# Patient Record
Sex: MALE | Race: WHITE | NOT HISPANIC OR LATINO | Employment: OTHER | URBAN - METROPOLITAN AREA
[De-identification: names, ages, dates, MRNs, and addresses within clinical notes are randomized per-mention and may not be internally consistent; named-entity substitution may affect disease eponyms.]

---

## 2022-05-18 ASSESSMENT — ENCOUNTER SYMPTOMS
SHORTNESS OF BREATH: 0
HEMATURIA: 0
NAUSEA: 0
WEAKNESS: 0
FREQUENCY: 0
COUGH: 0
PALPITATIONS: 0
ABDOMINAL PAIN: 0
CONSTIPATION: 0
HEMATOCHEZIA: 0
DIARRHEA: 0
DYSURIA: 0
HEARTBURN: 0
ARTHRALGIAS: 0
MYALGIAS: 0
FEVER: 0
DIZZINESS: 0
PARESTHESIAS: 0
NERVOUS/ANXIOUS: 0
EYE PAIN: 0
HEADACHES: 0
CHILLS: 0
SORE THROAT: 0
JOINT SWELLING: 0

## 2022-05-20 ENCOUNTER — OFFICE VISIT (OUTPATIENT)
Dept: INTERNAL MEDICINE | Facility: CLINIC | Age: 37
End: 2022-05-20
Payer: COMMERCIAL

## 2022-05-20 VITALS
HEART RATE: 81 BPM | HEIGHT: 77 IN | WEIGHT: 217.5 LBS | DIASTOLIC BLOOD PRESSURE: 76 MMHG | TEMPERATURE: 97.3 F | OXYGEN SATURATION: 98 % | BODY MASS INDEX: 25.68 KG/M2 | SYSTOLIC BLOOD PRESSURE: 122 MMHG

## 2022-05-20 DIAGNOSIS — Z00.00 ROUTINE GENERAL MEDICAL EXAMINATION AT A HEALTH CARE FACILITY: Primary | ICD-10-CM

## 2022-05-20 DIAGNOSIS — Z80.42 FAMILY HISTORY OF PROSTATE CANCER: ICD-10-CM

## 2022-05-20 DIAGNOSIS — Z12.5 SCREENING FOR PROSTATE CANCER: ICD-10-CM

## 2022-05-20 DIAGNOSIS — Z13.220 ENCOUNTER FOR SCREENING FOR LIPID DISORDER: ICD-10-CM

## 2022-05-20 DIAGNOSIS — R03.0 ELEVATED BLOOD PRESSURE READING WITHOUT DIAGNOSIS OF HYPERTENSION: ICD-10-CM

## 2022-05-20 PROCEDURE — G0103 PSA SCREENING: HCPCS | Performed by: INTERNAL MEDICINE

## 2022-05-20 PROCEDURE — 80061 LIPID PANEL: CPT | Performed by: INTERNAL MEDICINE

## 2022-05-20 PROCEDURE — 36415 COLL VENOUS BLD VENIPUNCTURE: CPT | Performed by: INTERNAL MEDICINE

## 2022-05-20 PROCEDURE — 99385 PREV VISIT NEW AGE 18-39: CPT | Performed by: INTERNAL MEDICINE

## 2022-05-20 PROCEDURE — 80053 COMPREHEN METABOLIC PANEL: CPT | Performed by: INTERNAL MEDICINE

## 2022-05-20 RX ORDER — LORATADINE 10 MG/1
10 TABLET ORAL DAILY
COMMUNITY
End: 2023-03-21

## 2022-05-20 ASSESSMENT — ENCOUNTER SYMPTOMS
DYSURIA: 0
SHORTNESS OF BREATH: 0
DIZZINESS: 0
CONSTIPATION: 0
PALPITATIONS: 0
FEVER: 0
COUGH: 0
CHILLS: 0
ARTHRALGIAS: 0
FREQUENCY: 0
MYALGIAS: 0
NAUSEA: 0
HEMATURIA: 0
HEADACHES: 0
JOINT SWELLING: 0
HEMATOCHEZIA: 0
DIARRHEA: 0
HEARTBURN: 0
WEAKNESS: 0
NERVOUS/ANXIOUS: 0
PARESTHESIAS: 0
SORE THROAT: 0
ABDOMINAL PAIN: 0
EYE PAIN: 0

## 2022-05-20 NOTE — PROGRESS NOTES
SUBJECTIVE:   CC: Scot Navarro is an 36 year old male who presents for preventative health visit.     Patient has been advised of split billing requirements and indicates understanding: Yes     Healthy Habits:     Getting at least 3 servings of Calcium per day:  Yes    Bi-annual eye exam:  Yes    Dental care twice a year:  NO    Sleep apnea or symptoms of sleep apnea:  Excessive snoring    Diet:  Regular (no restrictions)    Frequency of exercise:  None    Taking medications regularly:  Not Applicable    Medication side effects:  Not applicable    PHQ-2 Total Score: 0    Additional concerns today:  No    Ayden presents today for a physical exam. This is the first time I have met Ayden. We also discussed his blood pressure.    Today's PHQ-2 Score:   PHQ-2 ( 1999 [employer]) 5/18/2022   Q1: Little interest or pleasure in doing things 0   Q2: Feeling down, depressed or hopeless 0   PHQ-2 Score 0   Q1: Little interest or pleasure in doing things Not at all   Q2: Feeling down, depressed or hopeless Not at all   PHQ-2 Score 0     Abuse: Current or Past (Physical, Sexual or Emotional)- No  Do you feel safe in your environment? Yes    Social History     Tobacco Use     Smoking status: Never Smoker     Smokeless tobacco: Never Used   Substance Use Topics     Alcohol use: Yes     Alcohol/week: 2.0 standard drinks     Types: 2 Standard drinks or equivalent per week     If you drink alcohol do you typically have >3 drinks per day or >7 drinks per week? No    Alcohol Use 5/20/2022   Prescreen: >3 drinks/day or >7 drinks/week? -   Prescreen: >3 drinks/day or >7 drinks/week? No     Reviewed orders with patient. Reviewed health maintenance and updated orders accordingly - Yes    Labs reviewed in EPIC    Reviewed and updated as needed this visit by clinical staff   Tobacco  Allergies  Meds  Problems  Med Hx  Surg Hx  Fam Hx  Soc   Hx        Reviewed and updated as needed this visit by Provider   Tobacco  Allergies  Meds   "Problems  Med Hx  Surg Hx  Fam Hx             Review of Systems   Constitutional: Negative for chills and fever.   HENT: Negative for congestion, ear pain, hearing loss and sore throat.    Eyes: Negative for pain and visual disturbance.   Respiratory: Negative for cough and shortness of breath.    Cardiovascular: Negative for chest pain, palpitations and peripheral edema.   Gastrointestinal: Negative for abdominal pain, constipation, diarrhea, heartburn, hematochezia and nausea.   Genitourinary: Negative for dysuria, frequency, genital sores, hematuria, impotence, penile discharge and urgency.   Musculoskeletal: Negative for arthralgias, joint swelling and myalgias.   Skin: Negative for rash.   Neurological: Negative for dizziness, weakness, headaches and paresthesias.   Psychiatric/Behavioral: Negative for mood changes. The patient is not nervous/anxious.      OBJECTIVE:   /76   Pulse 81   Temp 97.3  F (36.3  C) (Tympanic)   Ht 1.956 m (6' 5\")   Wt 98.7 kg (217 lb 8 oz)   SpO2 98%   BMI 25.79 kg/m      Physical Exam  GENERAL: In no distress.  EYES: Conjunctivae/corneas clear. EOMs grossly intact.  HENT: NC/AT, facies symmetric. Neck supple. No LAD or thyromegaly noted.  RESP: CTAB. No w/r/r.  CV: RRR, no m/r/g.  GI: NT, ND, without rebound or guarding, no CVA tenderness, no hepatosplenomegaly appreciated.  MSK: Moves all four extremities freely.  SKIN: No significant ulcers, lesions or rashes on the visualized portions of the skin  NEURO: Alert. Oriented.  PSYCH: Linear thought process. Speech normal rate and volume. No tangential thoughts, hallucinations, or delusions.    Diagnostic Test Results: Labs reviewed in [facility]    ASSESSMENT/PLAN:   Routine general medical examination at a health care facility  Reviewed PMH. Discussed healthcare maintenance issues, including cancer screenings (colonoscopies, PSA), relevant immunizations (reports he received a Td ~5 years ago in different state), and cardiac " "risk factor screenings such as for cholesterol, HTN, and DM.    Elevated blood pressure reading without diagnosis of hypertension  BP consistently >130/80 at home but normal today. Encouraged him to make a BP check appt with our pharmacy downstairs and to bring his cuff with for validation. If cuff accurate and/or BP found to be consistently high, would start anti-HTN medication at that time.  - Comprehensive metabolic panel; Future    Encounter for screening for lipid disorder  - Lipid panel reflex to direct LDL Fasting; Future    Family history of prostate cancer  Screening for prostate cancer  Reports his half-brother (related through their father)  of prostate cancer in his 50s. Spent some time discussion PSA screening guidelines. Will check baseline today.  - Prostate Specific Antigen Screen; Future    COUNSELING:   Reviewed preventive health counseling, as reflected in patient instructions  Special attention given to:        Colorectal cancer screening       Prostate cancer screening    Estimated body mass index is 25.79 kg/m  as calculated from the following:    Height as of this encounter: 1.956 m (6' 5\").    Weight as of this encounter: 98.7 kg (217 lb 8 oz).    He reports that he has never smoked. He has never used smokeless tobacco.    Trent Nelson MD  [facility]  "

## 2022-05-20 NOTE — PATIENT INSTRUCTIONS
- I will send you a message on [employer]Control when I am able to look at the results of your tests from today    How to check your blood pressure at home  You can buy a home monitor in a drugstore, supermarket pharmacy, or other large store. Not all automated blood pressure machines are created equal. You can find a list of validated blood pressure cuffs (meaning they have been confirmed to give accurate readings) by going to www.validatebp.org. That website does not sell blood pressure cuffs, but rather it lists the exact models that have been validated to be accurate. Wrist blood pressure cuffs do not provide reliable comparisons to upper arm (brachial) cuffs. Be sure the arm cuff is the right size for your arm. Ask someone to measure around your upper arm. If your upper arm is more than 13 inches around, buy a monitor with a large cuff. To get a correct measurement, the cuff needs to be the right size.    It is important to measure your blood pressure periodically at home in between office visits. The readings you obtain during these blood pressure checks are often more valuable than the readings we obtain in clinic. However, it is even more important to check your blood pressure CORRECTLY at home. Follow these tips.    Check your blood pressure in the early morning (before you eat, drink, or take any medicines) and at one other random time of day. The random time of day does not need to be consistent from day to day.  Put the cuff on your arm. Remove clothes that get in the way of the cuff. Don t roll up your sleeve in a way that s tight around your arm. The cord should go toward your hand. Line it up with the middle of your forearm. The Velcro should attach easily on the cuff. If it doesn t reach, you may need a bigger cuff.  Wait 30 minutes if you have just eaten a lot, had a drink with caffeine or alcohol, used tobacco products, or exercised. Use the restroom if you need to. (Needing to go can raise your BP.)  Rest  both feet flat on the floor with your back supported. Rest your arm at heart level on a table or the arm of a chair.  Sit quietly for 5 minutes or more before taking your blood pressure. Avoid talking while your blood pressure is being measured.  Start the monitor. Press the button or squeeze the ball to measure your blood pressure. Write down the time, the measurement, and your pulse.  Wait 2 minutes.  Repeat 2 more times.  Take the average of the readings. That's your blood pressure.    Lastly, bring your home monitor into the office for us to validate! Compare your blood pressure monitor to the standardized method we use. It's a good idea to do this once per machine or if your machine starts giving you odd readings (suddenly much higher or lower than you're used to).

## 2022-05-21 LAB
ALBUMIN SERPL-MCNC: 4.6 G/DL (ref 3.4–5)
ALP SERPL-CCNC: 64 U/L (ref 40–150)
ALT SERPL W P-5'-P-CCNC: 48 U/L (ref 0–70)
ANION GAP SERPL CALCULATED.3IONS-SCNC: 6 MMOL/L (ref 3–14)
AST SERPL W P-5'-P-CCNC: 25 U/L (ref 0–45)
BILIRUB SERPL-MCNC: 1.3 MG/DL (ref 0.2–1.3)
BUN SERPL-MCNC: 13 MG/DL (ref 7–30)
CALCIUM SERPL-MCNC: 9.1 MG/DL (ref 8.5–10.1)
CHLORIDE BLD-SCNC: 106 MMOL/L (ref 94–109)
CHOLEST SERPL-MCNC: 224 MG/DL
CO2 SERPL-SCNC: 28 MMOL/L (ref 20–32)
CREAT SERPL-MCNC: 0.85 MG/DL (ref 0.66–1.25)
FASTING STATUS PATIENT QL REPORTED: YES
GFR SERPL CREATININE-BSD FRML MDRD: >90 ML/MIN/1.73M2
GLUCOSE BLD-MCNC: 97 MG/DL (ref 70–99)
HDLC SERPL-MCNC: 50 MG/DL
LDLC SERPL CALC-MCNC: 152 MG/DL
NONHDLC SERPL-MCNC: 174 MG/DL
POTASSIUM BLD-SCNC: 3.9 MMOL/L (ref 3.4–5.3)
PROT SERPL-MCNC: 7.7 G/DL (ref 6.8–8.8)
PSA SERPL-MCNC: 0.82 UG/L (ref 0–4)
SODIUM SERPL-SCNC: 140 MMOL/L (ref 133–144)
TRIGL SERPL-MCNC: 108 MG/DL

## 2022-06-16 ENCOUNTER — TRANSFERRED RECORDS (OUTPATIENT)
Dept: INTERNAL MEDICINE | Facility: CLINIC | Age: 37
End: 2022-06-16

## 2022-09-29 ENCOUNTER — TRANSFERRED RECORDS (OUTPATIENT)
Dept: HEALTH INFORMATION MANAGEMENT | Facility: CLINIC | Age: 37
End: 2022-09-29

## 2022-10-03 ENCOUNTER — HEALTH MAINTENANCE LETTER (OUTPATIENT)
Age: 37
End: 2022-10-03

## 2023-02-05 ENCOUNTER — HOSPITAL ENCOUNTER (EMERGENCY)
Facility: CLINIC | Age: 38
Discharge: HOME | End: 2023-02-05
Attending: EMERGENCY MEDICINE | Admitting: EMERGENCY MEDICINE
Payer: COMMERCIAL

## 2023-02-05 ENCOUNTER — APPOINTMENT (OUTPATIENT)
Dept: CT IMAGING | Facility: CLINIC | Age: 38
End: 2023-02-05
Attending: EMERGENCY MEDICINE
Payer: COMMERCIAL

## 2023-02-05 VITALS
RESPIRATION RATE: 16 BRPM | DIASTOLIC BLOOD PRESSURE: 119 MMHG | HEART RATE: 91 BPM | WEIGHT: 210 LBS | HEIGHT: 77 IN | TEMPERATURE: 98.7 F | BODY MASS INDEX: 24.79 KG/M2 | OXYGEN SATURATION: 97 % | SYSTOLIC BLOOD PRESSURE: 182 MMHG

## 2023-02-05 DIAGNOSIS — N20.1 URETEROLITHIASIS: ICD-10-CM

## 2023-02-05 DIAGNOSIS — N20.0 NEPHROLITHIASIS: ICD-10-CM

## 2023-02-05 DIAGNOSIS — R31.29 MICROSCOPIC HEMATURIA: ICD-10-CM

## 2023-02-05 DIAGNOSIS — R79.89 BLOOD CREATININE INCREASED COMPARED WITH PRIOR MEASUREMENT: ICD-10-CM

## 2023-02-05 LAB
ALBUMIN UR-MCNC: 30 MG/DL
ANION GAP SERPL CALCULATED.3IONS-SCNC: 12 MMOL/L (ref 7–15)
APPEARANCE UR: CLEAR
BILIRUB UR QL STRIP: NEGATIVE
BUN SERPL-MCNC: 15.3 MG/DL (ref 6–20)
CALCIUM SERPL-MCNC: 10.4 MG/DL (ref 8.6–10)
CHLORIDE SERPL-SCNC: 102 MMOL/L (ref 98–107)
COLOR UR AUTO: YELLOW
CREAT SERPL-MCNC: 1.29 MG/DL (ref 0.67–1.17)
DEPRECATED HCO3 PLAS-SCNC: 27 MMOL/L (ref 22–29)
ERYTHROCYTE [DISTWIDTH] IN BLOOD BY AUTOMATED COUNT: 11.6 % (ref 10–15)
GFR SERPL CREATININE-BSD FRML MDRD: 72 ML/MIN/1.73M2
GLUCOSE SERPL-MCNC: 103 MG/DL (ref 70–99)
GLUCOSE UR STRIP-MCNC: NEGATIVE MG/DL
HCT VFR BLD AUTO: 48 % (ref 40–53)
HGB BLD-MCNC: 16.9 G/DL (ref 13.3–17.7)
HGB UR QL STRIP: ABNORMAL
HYALINE CASTS: 1 /LPF
KETONES UR STRIP-MCNC: NEGATIVE MG/DL
LEUKOCYTE ESTERASE UR QL STRIP: NEGATIVE
MCH RBC QN AUTO: 32.1 PG (ref 26.5–33)
MCHC RBC AUTO-ENTMCNC: 35.2 G/DL (ref 31.5–36.5)
MCV RBC AUTO: 91 FL (ref 78–100)
MUCOUS THREADS #/AREA URNS LPF: PRESENT /LPF
NITRATE UR QL: NEGATIVE
PH UR STRIP: 6 [PH] (ref 5–7)
PLATELET # BLD AUTO: 214 10E3/UL (ref 150–450)
POTASSIUM SERPL-SCNC: 3.6 MMOL/L (ref 3.4–5.3)
RBC # BLD AUTO: 5.27 10E6/UL (ref 4.4–5.9)
RBC URINE: 60 /HPF
SODIUM SERPL-SCNC: 141 MMOL/L (ref 136–145)
SP GR UR STRIP: 1.03 (ref 1–1.03)
UROBILINOGEN UR STRIP-MCNC: NORMAL MG/DL
WBC # BLD AUTO: 11.4 10E3/UL (ref 4–11)
WBC URINE: 3 /HPF

## 2023-02-05 PROCEDURE — 36415 COLL VENOUS BLD VENIPUNCTURE: CPT | Performed by: EMERGENCY MEDICINE

## 2023-02-05 PROCEDURE — 258N000003 HC RX IP 258 OP 636: Performed by: EMERGENCY MEDICINE

## 2023-02-05 PROCEDURE — 96361 HYDRATE IV INFUSION ADD-ON: CPT

## 2023-02-05 PROCEDURE — 80048 BASIC METABOLIC PNL TOTAL CA: CPT | Performed by: EMERGENCY MEDICINE

## 2023-02-05 PROCEDURE — 96375 TX/PRO/DX INJ NEW DRUG ADDON: CPT

## 2023-02-05 PROCEDURE — 250N000011 HC RX IP 250 OP 636: Performed by: EMERGENCY MEDICINE

## 2023-02-05 PROCEDURE — 81001 URINALYSIS AUTO W/SCOPE: CPT | Performed by: EMERGENCY MEDICINE

## 2023-02-05 PROCEDURE — 96374 THER/PROPH/DIAG INJ IV PUSH: CPT

## 2023-02-05 PROCEDURE — 85014 HEMATOCRIT: CPT | Performed by: EMERGENCY MEDICINE

## 2023-02-05 PROCEDURE — 74176 CT ABD & PELVIS W/O CONTRAST: CPT

## 2023-02-05 PROCEDURE — 99285 EMERGENCY DEPT VISIT HI MDM: CPT | Mod: 25

## 2023-02-05 RX ORDER — TAMSULOSIN HYDROCHLORIDE 0.4 MG/1
0.4 CAPSULE ORAL DAILY
Qty: 7 CAPSULE | Refills: 0 | Status: SHIPPED | OUTPATIENT
Start: 2023-02-05 | End: 2023-02-12

## 2023-02-05 RX ORDER — ONDANSETRON 4 MG/1
4 TABLET, ORALLY DISINTEGRATING ORAL EVERY 6 HOURS PRN
Qty: 10 TABLET | Refills: 0 | Status: SHIPPED | OUTPATIENT
Start: 2023-02-05 | End: 2023-03-21

## 2023-02-05 RX ORDER — HYDROCODONE BITARTRATE AND ACETAMINOPHEN 5; 325 MG/1; MG/1
1 TABLET ORAL EVERY 6 HOURS PRN
Qty: 12 TABLET | Refills: 0 | Status: SHIPPED | OUTPATIENT
Start: 2023-02-05 | End: 2023-03-21

## 2023-02-05 RX ORDER — IBUPROFEN 600 MG/1
600 TABLET, FILM COATED ORAL EVERY 6 HOURS PRN
Qty: 40 TABLET | Refills: 0 | Status: SHIPPED | OUTPATIENT
Start: 2023-02-05 | End: 2023-02-15

## 2023-02-05 RX ORDER — KETOROLAC TROMETHAMINE 15 MG/ML
15 INJECTION, SOLUTION INTRAMUSCULAR; INTRAVENOUS ONCE
Status: COMPLETED | OUTPATIENT
Start: 2023-02-05 | End: 2023-02-05

## 2023-02-05 RX ORDER — ONDANSETRON 2 MG/ML
4 INJECTION INTRAMUSCULAR; INTRAVENOUS ONCE
Status: COMPLETED | OUTPATIENT
Start: 2023-02-05 | End: 2023-02-05

## 2023-02-05 RX ADMIN — KETOROLAC TROMETHAMINE 15 MG: 15 INJECTION, SOLUTION INTRAMUSCULAR; INTRAVENOUS at 14:37

## 2023-02-05 RX ADMIN — ONDANSETRON 4 MG: 2 INJECTION INTRAMUSCULAR; INTRAVENOUS at 14:38

## 2023-02-05 RX ADMIN — SODIUM CHLORIDE 1000 ML: 9 INJECTION, SOLUTION INTRAVENOUS at 14:38

## 2023-02-05 ASSESSMENT — ENCOUNTER SYMPTOMS
BLOOD IN STOOL: 0
DIARRHEA: 0
BACK PAIN: 1
NAUSEA: 1
FEVER: 0
FLANK PAIN: 1
DYSURIA: 0
VOMITING: 1

## 2023-02-05 ASSESSMENT — ACTIVITIES OF DAILY LIVING (ADL): ADLS_ACUITY_SCORE: 35

## 2023-02-05 NOTE — ED TRIAGE NOTES
Left flank pain wraps into left groin and lower left back pain since yesterday. Feeling nauseated from pain.      Triage Assessment     Row Name 02/05/23 1237       Triage Assessment (Adult)    Airway WDL WDL       Respiratory WDL    Respiratory WDL WDL       Skin Circulation/Temperature WDL    Skin Circulation/Temperature WDL WDL       Cardiac WDL    Cardiac WDL X       Peripheral/Neurovascular WDL    Peripheral Neurovascular WDL WDL       Cognitive/Neuro/Behavioral WDL    Cognitive/Neuro/Behavioral WDL WDL

## 2023-02-05 NOTE — ED PROVIDER NOTES
"  History     Chief Complaint:  Left Flank Pain and Left Lower Back Pain     The history is provided by the patient.      Scot Navarro is a 37 year old male who presents with left flank and lower back pain. He noticed the pain in his left back and flank, radiating to his testicle and groin yesterday morning with associated nausea and vomiting. However, he then felt improved so he did not seek care. This morning the pain returned with waxing and waning intensity, currently 5-6/10, for which he took 600 mg ibuprofen. He has no associated fever, diarrhea, dysuria, or penile discharge.    He notes he occasionally has back pain which radiates to right leg but this pain is different.     Independent Historian:   As above.     Review of External Notes: Primary care visit May 2022, prior BMP     ROS:  Review of Systems   Constitutional: Negative for activity change and fever.   Gastrointestinal: Positive for nausea and vomiting. Negative for blood in stool and diarrhea.   Genitourinary: Positive for flank pain (Left) and testicular pain (Radiates from back ). Negative for dysuria and penile discharge.        + Groin pain radiating from back   Musculoskeletal: Positive for back pain (Left lower).   All other systems reviewed and are negative.    Allergies:  No Known Allergies     Medications:    Loratadine     Past Medical History:    Anal fistula    Social History:  Patient presents alone, drove himself.   Patient's wife is a hospitalist PA.     Physical Exam     Patient Vitals for the past 24 hrs:   BP Temp Temp src Pulse Resp SpO2 Height Weight   02/05/23 1549 (!) 182/119 -- -- 91 -- -- -- --   02/05/23 1548 -- -- -- -- -- 97 % -- --   02/05/23 1237 (!) 174/110 98.7  F (37.1  C) Oral 102 16 98 % 1.956 m (6' 5\") 95.3 kg (210 lb)        Physical Exam  General: Well-developed and well-nourished. Well appearing young  man. Cooperative.  Head:  Atraumatic.  Eyes:  Conjunctivae, lids, and sclerae are " normal.  Neck:  Supple. Normal range of motion.  CV:  Regular rate and rhythm. Normal heart sounds with no murmurs, rubs, or gallops detected.  Resp:  No respiratory distress. Clear to auscultation bilaterally without decreased breath sounds, wheezing, rales, or rhonchi.  GI:  Soft. Non-distended. Non-tender.  No CVA tenderness.    MS:  Normal ROM.   Skin:  Warm. Non-diaphoretic. No pallor.  Neuro:  Awake. A&Ox3. Normal strength.  Psych: Normal mood and affect. Normal speech.  Vitals reviewed.      Emergency Department Course   Imaging:  Abd/pelvis CT no contrast - Stone Protocol   Final Result   IMPRESSION: 5 mm distal left ureteral stone with associated mild hydroureter/hydronephrosis. Multiple bilateral nonobstructing kidney stones are also noted. No right ureteral stone or hydronephrosis.            Report per radiology    Laboratory:  Labs Ordered and Resulted from Time of ED Arrival to Time of ED Departure   ROUTINE UA WITH MICROSCOPIC REFLEX TO CULTURE - Abnormal       Result Value    Color Urine Yellow      Appearance Urine Clear      Glucose Urine Negative      Bilirubin Urine Negative      Ketones Urine Negative      Specific Gravity Urine 1.026      Blood Urine Moderate (*)     pH Urine 6.0      Protein Albumin Urine 30 (*)     Urobilinogen Urine Normal      Nitrite Urine Negative      Leukocyte Esterase Urine Negative      Mucus Urine Present (*)     RBC Urine 60 (*)     WBC Urine 3      Hyaline Casts Urine 1     CBC WITH PLATELETS - Abnormal    WBC Count 11.4 (*)     RBC Count 5.27      Hemoglobin 16.9      Hematocrit 48.0      MCV 91      MCH 32.1      MCHC 35.2      RDW 11.6      Platelet Count 214     BASIC METABOLIC PANEL - Abnormal    Sodium 141      Potassium 3.6      Chloride 102      Carbon Dioxide (CO2) 27      Anion Gap 12      Urea Nitrogen 15.3      Creatinine 1.29 (*)     Calcium 10.4 (*)     Glucose 103 (*)     GFR Estimate 72        Emergency Department Course &  Assessments:  Interventions:  Medications   0.9% sodium chloride BOLUS (1,000 mLs Intravenous Stopped 2/5/23 1554)   ketorolac (TORADOL) injection 15 mg (15 mg Intravenous Given 2/5/23 1437)   ondansetron (ZOFRAN) injection 4 mg (4 mg Intravenous Given 2/5/23 1438)      Independent Interpretation (X-rays, CTs, rhythm strip):  I independently reviewed the patient's abdominal CT.     Assessments/Consultations/Discussion of Management or Tests:     ED Course as of 02/05/23 1607   Sun Feb 05, 2023   1435 I obtained patient's history and examined as noted above.    1543 I rechecked the patient and explained findings. He is comfortable with discharge and notes he is going on a cruise on Tuesday.      Social Determinants of Health affecting care:   Wife who is a PA.     Disposition:  He was discharged.     Impression & Plan    Medical Decision Making:  Ayden is a 37 year old man presenting with left flank pain radiating to his left groin which has been waxing and waning since yesterday.  He did have nausea and vomiting yesterday but none today.  He did take ibuprofen prior to arrival.  He appears well on exam without significant abdominal or CVA tenderness.    Given concern for ureterolithiasis he was sent for CT of the abdomen and pelvis.  This confirms ureterolithiasis which is distal and 5 mm with mild hydronephrosis.  He also has incidental bilateral nephrolithiasis.  While urinalysis does reveal microscopic hematuria, there is no UTI.  His labs are otherwise significant for creatinine of 1.29 from baseline of 0.85 in May 2022.  This mild acute kidney injury should improve with IV fluids and passage of the stone.  It does not warrant emergent urologic consultation.    Ayden was feeling improved after the aforementioned IV fluids as well as Toradol and Zofran and is comfortable with plan for discharge with trial of passage.  He was provided with a urine strainer and Flomax.  He will use Motrin for mild pain and I provided  him with Norco for more severe pain as well as Zofran as needed for nausea and vomiting.  I recommended he drink lots of water, limit caffeine, and follow-up closely with urology - particularly has multiple bilateral nephrolithiasis.  Indications for return were discussed and all of his questions were answered.  He verbalized understanding and is amenable to discharge.    Of note, he is noted to be hypertensive here to 180s systolic without symptoms.  He had a visit in May 2022 with a normal BP.  I suspect elevation today is related to anxiety and pain alone but repeat blood pressure checks should occur upon follow-up.    Diagnosis:    ICD-10-CM    1. Ureterolithiasis  N20.1       2. Nephrolithiasis  N20.0       3. Microscopic hematuria  R31.29       4. Blood creatinine increased compared with prior measurement  R79.89            Discharge Medications:  New Prescriptions    HYDROCODONE-ACETAMINOPHEN (NORCO) 5-325 MG TABLET    Take 1 tablet by mouth every 6 hours as needed for severe pain (7-10)    IBUPROFEN (ADVIL/MOTRIN) 600 MG TABLET    Take 1 tablet (600 mg) by mouth every 6 hours as needed for moderate pain (4-6)    ONDANSETRON (ZOFRAN ODT) 4 MG ODT TAB    Take 1 tablet (4 mg) by mouth every 6 hours as needed for nausea or vomiting    TAMSULOSIN (FLOMAX) 0.4 MG CAPSULE    Take 1 capsule (0.4 mg) by mouth daily for 7 days      Scribe Disclosure:  I, Reshma Stephanie, am serving as a scribe at 2:44 PM on 2/5/2023 to document services personally performed by Marilu Alvarez MD based on my observations and the provider's statements to me.     2/5/2023   Mariul Alvarez MD Dixson, Kylie S, MD  02/23/23 3277

## 2023-02-05 NOTE — DISCHARGE INSTRUCTIONS
Your kidney stone will likely pass on its own. To help, take Flomax until it has passed. Strain your urine to look for stone.  Use Motrin for mild pain and Norco for moderate-severe pain.  Use Zofran as needed for nausea or vomiting.  Drink lots of water.  Limit caffeine.  Follow up with urology.  Return to the ER with uncontrolled pain, fever >100.4F, decreased urine output, or any other new or concerning symptoms.

## 2023-02-23 ASSESSMENT — ENCOUNTER SYMPTOMS: ACTIVITY CHANGE: 0

## 2023-03-21 ENCOUNTER — OFFICE VISIT (OUTPATIENT)
Dept: INTERNAL MEDICINE | Facility: CLINIC | Age: 38
End: 2023-03-21
Payer: COMMERCIAL

## 2023-03-21 VITALS
HEIGHT: 77 IN | RESPIRATION RATE: 16 BRPM | SYSTOLIC BLOOD PRESSURE: 170 MMHG | BODY MASS INDEX: 27.95 KG/M2 | HEART RATE: 88 BPM | TEMPERATURE: 98.2 F | DIASTOLIC BLOOD PRESSURE: 105 MMHG | OXYGEN SATURATION: 99 % | WEIGHT: 236.7 LBS

## 2023-03-21 DIAGNOSIS — I10 BENIGN ESSENTIAL HYPERTENSION: Primary | ICD-10-CM

## 2023-03-21 LAB
ANION GAP SERPL CALCULATED.3IONS-SCNC: 13 MMOL/L (ref 7–15)
BUN SERPL-MCNC: 15.3 MG/DL (ref 6–20)
CALCIUM SERPL-MCNC: 10 MG/DL (ref 8.6–10)
CHLORIDE SERPL-SCNC: 103 MMOL/L (ref 98–107)
CREAT SERPL-MCNC: 0.86 MG/DL (ref 0.67–1.17)
DEPRECATED HCO3 PLAS-SCNC: 25 MMOL/L (ref 22–29)
GFR SERPL CREATININE-BSD FRML MDRD: >90 ML/MIN/1.73M2
GLUCOSE SERPL-MCNC: 90 MG/DL (ref 70–99)
POTASSIUM SERPL-SCNC: 3.5 MMOL/L (ref 3.4–5.3)
SODIUM SERPL-SCNC: 141 MMOL/L (ref 136–145)

## 2023-03-21 PROCEDURE — 80048 BASIC METABOLIC PNL TOTAL CA: CPT | Performed by: PHYSICIAN ASSISTANT

## 2023-03-21 PROCEDURE — 36415 COLL VENOUS BLD VENIPUNCTURE: CPT | Performed by: PHYSICIAN ASSISTANT

## 2023-03-21 PROCEDURE — 99214 OFFICE O/P EST MOD 30 MIN: CPT | Performed by: PHYSICIAN ASSISTANT

## 2023-03-21 RX ORDER — LISINOPRIL 10 MG/1
10 TABLET ORAL DAILY
Qty: 30 TABLET | Refills: 1 | Status: SHIPPED | OUTPATIENT
Start: 2023-03-21 | End: 2023-04-13

## 2023-04-13 ENCOUNTER — OFFICE VISIT (OUTPATIENT)
Dept: INTERNAL MEDICINE | Facility: CLINIC | Age: 38
End: 2023-04-13
Payer: COMMERCIAL

## 2023-04-13 VITALS
DIASTOLIC BLOOD PRESSURE: 97 MMHG | WEIGHT: 238.1 LBS | BODY MASS INDEX: 28.11 KG/M2 | HEIGHT: 77 IN | TEMPERATURE: 97.8 F | SYSTOLIC BLOOD PRESSURE: 152 MMHG | HEART RATE: 84 BPM | OXYGEN SATURATION: 97 %

## 2023-04-13 DIAGNOSIS — I10 ESSENTIAL HYPERTENSION: Primary | ICD-10-CM

## 2023-04-13 DIAGNOSIS — N20.0 NEPHROLITHIASIS: ICD-10-CM

## 2023-04-13 LAB
ANION GAP SERPL CALCULATED.3IONS-SCNC: 16 MMOL/L (ref 7–15)
BUN SERPL-MCNC: 17.1 MG/DL (ref 6–20)
CALCIUM SERPL-MCNC: 9.8 MG/DL (ref 8.6–10)
CHLORIDE SERPL-SCNC: 106 MMOL/L (ref 98–107)
CREAT SERPL-MCNC: 0.88 MG/DL (ref 0.67–1.17)
DEPRECATED HCO3 PLAS-SCNC: 20 MMOL/L (ref 22–29)
GFR SERPL CREATININE-BSD FRML MDRD: >90 ML/MIN/1.73M2
GLUCOSE SERPL-MCNC: 91 MG/DL (ref 70–99)
POTASSIUM SERPL-SCNC: 3.9 MMOL/L (ref 3.4–5.3)
SODIUM SERPL-SCNC: 142 MMOL/L (ref 136–145)

## 2023-04-13 PROCEDURE — 99214 OFFICE O/P EST MOD 30 MIN: CPT | Performed by: INTERNAL MEDICINE

## 2023-04-13 PROCEDURE — 36415 COLL VENOUS BLD VENIPUNCTURE: CPT | Performed by: INTERNAL MEDICINE

## 2023-04-13 PROCEDURE — 80048 BASIC METABOLIC PNL TOTAL CA: CPT | Performed by: INTERNAL MEDICINE

## 2023-04-13 RX ORDER — LISINOPRIL 20 MG/1
20 TABLET ORAL DAILY
Qty: 90 TABLET | Refills: 0 | Status: SHIPPED | OUTPATIENT
Start: 2023-04-13 | End: 2023-07-07

## 2023-04-13 NOTE — PATIENT INSTRUCTIONS
- I will send you a message on [employer] when I am able to look at the results of your tests from today  - Urology referral    Today we discussed your hypertension (high blood pressure). Four important things to remember about your hypertension:    1) Take all medications as prescribed! Today we discussed your blood pressure medications and decided to increase lisinopril from 10mg to 20mg daily.    2) Lose weight! Losing weight is the best thing you can do to lower your blood pressure. Studies have found that for every 2 pounds you lose, your blood pressure will go down by 1 point. Ex: if you lose 10 pounds, your blood pressure should go down by 5 points. That's better than any medication, plus it will impact your health in a number of other positive ways. This may be a good time to make a weight loss goal.    3) Lower your sodium intake! Eating too much salt causes your body to hold onto extra water, which makes your blood pressure higher. Ditching the salt shaker is a good thing, but most of our sodium intake actually comes from pre-packaged foods. Read labels on cans and food packages. The labels tell you how much sodium is in each serving. Make sure that you look at the serving size. If you eat more than the serving size, you have eaten more sodium. Decreasing your sodium intake by more than 1,000mg per day can lower your blood pressure by up to 5 points and can be as effective as starting a blood pressure medication.    4) Check your blood pressure at home! You can buy a home monitor in a drugstore, supermarket pharmacy, or other large store. Not all automated blood pressure machines are created equal. You can find a list of validated blood pressure cuffs (meaning they have been confirmed to give accurate readings) by going to www.validatebp.org. That website does not sell blood pressure cuffs, but rather it lists the exact models that have been validated to be accurate. Wrist blood pressure cuffs do not provide  reliable comparisons to upper arm (brachial) cuffs. Be sure the arm cuff is the right size for your arm. Ask someone to measure around your upper arm. If your upper arm is more than 13 inches around, buy a monitor with a large cuff. To get a correct measurement, the cuff needs to be the right size.    It is important to measure your blood pressure periodically at home in between office visits. The readings you obtain during these blood pressure checks are often more valuable than the readings we obtain in clinic. However, it is even more important to check your blood pressure CORRECTLY at home. Follow these tips.    Check your blood pressure in the early morning (before you eat, drink, or take any medicines) and at one other random time of day. The random time of day does not need to be consistent from day to day.  Put the cuff on your arm. Remove clothes that get in the way of the cuff. Don t roll up your sleeve in a way that s tight around your arm. The cord should go toward your hand. Line it up with the middle of your forearm. The Velcro should attach easily on the cuff. If it doesn t reach, you may need a bigger cuff.  Wait 30 minutes if you have just eaten a lot, had a drink with caffeine or alcohol, used tobacco products, or exercised. Use the restroom if you need to. (Needing to go can raise your BP.)  Rest both feet flat on the floor with your back supported. Rest your arm at heart level on a table or the arm of a chair.  Sit quietly for 5 minutes or more before taking your blood pressure. Avoid talking while your blood pressure is being measured.  Start the monitor. Press the button or squeeze the ball to measure your blood pressure. Write down the time, the measurement, and your pulse.  Wait 2 minutes.  Repeat 2 more times.  Take the average of the readings. That's your blood pressure.    Lastly, bring your home monitor into the office for us to validate! Compare your blood pressure monitor to the  standardized method we use. It's a good idea to do this once per machine or if your machine starts giving you odd readings (suddenly much higher or lower than you're used to).

## 2023-05-02 ENCOUNTER — VIRTUAL VISIT (OUTPATIENT)
Dept: UROLOGY | Facility: CLINIC | Age: 38
End: 2023-05-02
Attending: INTERNAL MEDICINE
Payer: COMMERCIAL

## 2023-05-02 VITALS — WEIGHT: 235 LBS | BODY MASS INDEX: 27.75 KG/M2 | HEIGHT: 77 IN

## 2023-05-02 DIAGNOSIS — N20.0 NEPHROLITHIASIS: ICD-10-CM

## 2023-05-02 PROCEDURE — 99204 OFFICE O/P NEW MOD 45 MIN: CPT | Mod: VID | Performed by: PHYSICIAN ASSISTANT

## 2023-05-02 ASSESSMENT — PAIN SCALES - GENERAL: PAINLEVEL: NO PAIN (0)

## 2023-05-02 NOTE — LETTER
5/2/2023       RE: Scot Navarro  [address]  [facility] [address]     Dear Colleague,    Thank you for referring your patient, Scot Navarro, to the [facility] at [facility]. Please see a copy of my visit note below.    Send link to cell phone    Ayden is a 37 year old who is being evaluated via a billable video visit.      How would you like to obtain your AVS? MyChart  If the video visit is dropped, the invitation should be resent by: Text to cell phone: [phone]  Will anyone else be joining your video visit? No        Video-Visit Details    Type of service:  Video Visit     Originating Location (pt. Location): Home    Distant Location (provider location):  On-site  Platform used for Video Visit: [employer]  Start time: 10:32am  End time: 10:45am    CC: Ureteral stone.    HPI: It is a pleasure to see . Scot Navarro, a pleasant 37 year old male seen today in ER follow up for evaluation of the above. This was first detected on CT in the ED on 2/5/23 after the patient presented complaining of acute renal colic symptoms. The CT noted a 5 mm distal left ureteral stone with associated mild hydroureter/hydronephrosis as well as multiple bilateral nonobstructing kidney stones (3 on the right, tiny one on the left). UA in the ED was negative for infection. BMP revealed Cr 1.29 (since normalized on recheck) and Ca 10.4 (since normalized on recheck). With pain under good control, the patient was discharged with a prescription for tamsulosin and instructions to follow up with urology.    Currently, the patient reports feeling well and suspects he passed the stone in ~6 days. No captured stone. Denies gross hematuria, dysuria, fevers, chills, N/V. No prior history of kidney stones.    Used to drink up to 8 Mt Dew in a day and has significantly reduced this in the past year. Now includes water.     RECENT IMAGING:  EXAM: CT  ABDOMEN PELVIS W/O CONTRAST  LOCATION: [facility]  DATE/TIME: 2/5/2023 3:01 PM     INDICATION: Left flank pain  COMPARISON: None available  TECHNIQUE: CT scan of the abdomen and pelvis was performed without intravenous contrast Multiplanar reformats were obtained. Dose reduction techniques were used.     FINDINGS:   LOWER CHEST: Mild basilar pulmonary opacities, likely atelectasis.     ABDOMEN/PELVIS: Limited evaluation of the abdominal organs due to lack of intravenous contrast.     HEPATOBILIARY: The unenhanced liver and gallbladder are grossly unremarkable.     PANCREAS: The unenhanced pancreas is grossly unremarkable.     SPLEEN: No splenomegaly.     ADRENAL GLANDS: No adrenal nodules.     KIDNEYS/BLADDER: 5 mm stone in the distal left ureter (series 3 image 217) with associated mild hydroureter/hydronephrosis. Multiple bilateral kidney stones. No right ureteral stone or hydronephrosis. The urinary bladder is unremarkable.     BOWEL: No abnormally dilated bowel loops. The appendix is visualized and appears normal.     PERITONEUM: No evidence of free fluid in the abdomen and pelvis. No free peritoneal or portal venous gas.     PELVIC ORGANS: Unremarkable.     VASCULATURE: Unremarkable.     LYMPH NODES: Unremarkable.     MUSCULOSKELETAL: No suspicious osseous lesion.                                                                      IMPRESSION: 5 mm distal left ureteral stone with associated mild hydroureter/hydronephrosis. Multiple bilateral nonobstructing kidney stones are also noted. No right ureteral stone or hydronephrosis.     Past Medical History:   Diagnosis Date    Hernia, abdominal        Past Surgical History:   Procedure Laterality Date    HERNIA REPAIR      NEPHRECTOMY         Current Outpatient Medications   Medication Sig Dispense Refill    lisinopril (ZESTRIL) 20 MG tablet Take 1 tablet (20 mg) by mouth daily 90 tablet 0       No Known Allergies    FAMILY HISTORY: There  "is no family h/o  malignancy.  There is no family h/o urolithiasis.     Social History     Socioeconomic History    Marital status:      Spouse name: Not on file    Number of children: Not on file    Years of education: Not on file    Highest education level: Not on file   Occupational History    Not on file   Tobacco Use    Smoking status: Never    Smokeless tobacco: Never   Vaping Use    Vaping status: Never Used   Substance and Sexual Activity    Alcohol use: Yes     Alcohol/week: 2.0 standard drinks of alcohol     Types: 2 Standard drinks or equivalent per week    Drug use: Not Currently    Sexual activity: Yes     Partners: Female   Other Topics Concern    Not on file   Social History Narrative    Not on file     Social Determinants of Health     Financial Resource Strain: Not on file   Food Insecurity: Not on file   Transportation Needs: Not on file   Physical Activity: Not on file   Stress: Not on file   Social Connections: Not on file   Intimate Partner Violence: Not on file   Housing Stability: Not on file         GENERAL PHYSICAL EXAM:   Ht 1.956 m (6' 5\")   Wt 106.6 kg (235 lb)   BMI 27.87 kg/m    PSYCH: NAD  NEURO: AAO x3      ASSESSMENT AND PLAN: 37 year old male with a left-sided ureteral calculus with associated  Hydronephrosis, suspect passage given MENDEL/pain resolved.     - Warning signs discussed including fevers, chills, N/V, gross hematuria, severe pain that is refractory to medications which should prompt more urgent evaluation. Patient understands to proceed to the ER should these warning signs occur outside of clinic hours.    During counseling for this visit, we covered the natural history of kidney stones, the risk of progression to symptomatic pain/infection, and the possibility of renal failure/kidney damage.  We covered future  treatment options of renal stones including ureteroscopy/laser/stent and extracorporeal shock wave.      Standard recommendations for kidney stone " prevention were discussed.  Neph referral for medical management and prevention.   CT 1 year to follow-up on stone size and number. Can consider KUB if stable.       Luiza Smith PA-C  [facility]      30 minutes spent on the date of the encounter doing chart review, review of outside records, review of test results, interpretation of tests, patient visit and documentation and review of CT images.

## 2023-05-02 NOTE — PATIENT INSTRUCTIONS
Standard recommendations for kidney stone prevention:  -These include maintaining fluid intake of 3 liters per day or more with a goal of making 2 or more liters of urine per day.  If alcoholic or caffeinated beverages are consumed, you need to drink water along with these beverages to maintain hydration.    -A few ounces of lemon juice concentrate a day diluted in water can help prevent stones (citrate is a stone inhibitor).    -Sodium influences calcium excretion in the urine. Limit intake of red meat, salt, and salty processed foods.    -Uric acid-high levels in the blood can lead to kidney stones and gout, especially if the urine pH is low.  Reduce protein intake, especially red meats.  -Weight loss, if overweight, can reduce the recurrence of kidney stones.  -Diabetes-if diabetic, you are at a greater risk of having kidney stones.  -Maintain calcium intake in diet through continued consumption of dairy products etc.    -Limit foods that are high in oxalate such as spinach, sweet potatoes, dark chocolate, soy products, and some nuts such as peanuts.   -Medications that can increase risk of kidney stones: Ephedrine, Guaifenesin, Triamterene, Lasix, Diamox, Topamax, Zonegran, laxatives.     ______________________________________________  Please make an appt with Nephrology for prevention of kidney stones.   [facility]  [facility]    [address]  [place]  [address]  T:  [phone]

## 2023-05-02 NOTE — PROGRESS NOTES
Send link to cell phone    Ayden is a 37 year old who is being evaluated via a billable video visit.      How would you like to obtain your AVS? MyChart  If the video visit is dropped, the invitation should be resent by: Text to cell phone: [phone]  Will anyone else be joining your video visit? No        Video-Visit Details    Type of service:  Video Visit     Originating Location (pt. Location): Home    Distant Location (provider location):  On-site  Platform used for Video Visit: [employer]  Start time: 10:32am  End time: 10:45am    CC: Ureteral stone.    HPI: It is a pleasure to see . Scot Navarro, a pleasant 37 year old male seen today in ER follow up for evaluation of the above. This was first detected on CT in the ED on 2/5/23 after the patient presented complaining of acute renal colic symptoms. The CT noted a 5 mm distal left ureteral stone with associated mild hydroureter/hydronephrosis as well as multiple bilateral nonobstructing kidney stones (3 on the right, tiny one on the left). UA in the ED was negative for infection. BMP revealed Cr 1.29 (since normalized on recheck) and Ca 10.4 (since normalized on recheck). With pain under good control, the patient was discharged with a prescription for tamsulosin and instructions to follow up with urology.    Currently, the patient reports feeling well and suspects he passed the stone in ~6 days. No captured stone. Denies gross hematuria, dysuria, fevers, chills, N/V. No prior history of kidney stones.    Used to drink up to 8 Mt Dew in a day and has significantly reduced this in the past year. Now includes water.     RECENT IMAGING:  EXAM: CT ABDOMEN PELVIS W/O CONTRAST  LOCATION: [facility]  DATE/TIME: 2/5/2023 3:01 PM     INDICATION: Left flank pain  COMPARISON: None available  TECHNIQUE: CT scan of the abdomen and pelvis was performed without intravenous contrast Multiplanar reformats were obtained. Dose reduction techniques  were used.     FINDINGS:   LOWER CHEST: Mild basilar pulmonary opacities, likely atelectasis.     ABDOMEN/PELVIS: Limited evaluation of the abdominal organs due to lack of intravenous contrast.     HEPATOBILIARY: The unenhanced liver and gallbladder are grossly unremarkable.     PANCREAS: The unenhanced pancreas is grossly unremarkable.     SPLEEN: No splenomegaly.     ADRENAL GLANDS: No adrenal nodules.     KIDNEYS/BLADDER: 5 mm stone in the distal left ureter (series 3 image 217) with associated mild hydroureter/hydronephrosis. Multiple bilateral kidney stones. No right ureteral stone or hydronephrosis. The urinary bladder is unremarkable.     BOWEL: No abnormally dilated bowel loops. The appendix is visualized and appears normal.     PERITONEUM: No evidence of free fluid in the abdomen and pelvis. No free peritoneal or portal venous gas.     PELVIC ORGANS: Unremarkable.     VASCULATURE: Unremarkable.     LYMPH NODES: Unremarkable.     MUSCULOSKELETAL: No suspicious osseous lesion.                                                                      IMPRESSION: 5 mm distal left ureteral stone with associated mild hydroureter/hydronephrosis. Multiple bilateral nonobstructing kidney stones are also noted. No right ureteral stone or hydronephrosis.     Past Medical History:   Diagnosis Date     Hernia, abdominal        Past Surgical History:   Procedure Laterality Date     HERNIA REPAIR       NEPHRECTOMY         Current Outpatient Medications   Medication Sig Dispense Refill     lisinopril (ZESTRIL) 20 MG tablet Take 1 tablet (20 mg) by mouth daily 90 tablet 0       No Known Allergies    FAMILY HISTORY: There is no family h/o  malignancy.  There is no family h/o urolithiasis.     Social History     Socioeconomic History     Marital status:      Spouse name: Not on file     Number of children: Not on file     Years of education: Not on file     Highest education level: Not on file   Occupational History      "Not on file   Tobacco Use     Smoking status: Never     Smokeless tobacco: Never   Vaping Use     Vaping status: Never Used   Substance and Sexual Activity     Alcohol use: Yes     Alcohol/week: 2.0 standard drinks of alcohol     Types: 2 Standard drinks or equivalent per week     Drug use: Not Currently     Sexual activity: Yes     Partners: Female   Other Topics Concern     Not on file   Social History Narrative     Not on file     Social Determinants of Health     Financial Resource Strain: Not on file   Food Insecurity: Not on file   Transportation Needs: Not on file   Physical Activity: Not on file   Stress: Not on file   Social Connections: Not on file   Intimate Partner Violence: Not on file   Housing Stability: Not on file         GENERAL PHYSICAL EXAM:   Ht 1.956 m (6' 5\")   Wt 106.6 kg (235 lb)   BMI 27.87 kg/m    PSYCH: NAD  NEURO: AAO x3      ASSESSMENT AND PLAN: 37 year old male with a left-sided ureteral calculus with associated  Hydronephrosis, suspect passage given MENDEL/pain resolved.     - Warning signs discussed including fevers, chills, N/V, gross hematuria, severe pain that is refractory to medications which should prompt more urgent evaluation. Patient understands to proceed to the ER should these warning signs occur outside of clinic hours.    During counseling for this visit, we covered the natural history of kidney stones, the risk of progression to symptomatic pain/infection, and the possibility of renal failure/kidney damage.  We covered future  treatment options of renal stones including ureteroscopy/laser/stent and extracorporeal shock wave.      Standard recommendations for kidney stone prevention were discussed.  Neph referral for medical management and prevention.   CT 1 year to follow-up on stone size and number. Can consider KUB if stable.       Luiza Smith PA-C  [facility]      30 minutes spent on the date of the encounter doing chart review, review of outside records, " review of test results, interpretation of tests, patient visit and documentation and review of CT images.

## 2023-08-13 ENCOUNTER — HEALTH MAINTENANCE LETTER (OUTPATIENT)
Age: 38
End: 2023-08-13

## 2023-10-11 ENCOUNTER — TELEPHONE (OUTPATIENT)
Dept: INTERNAL MEDICINE | Facility: CLINIC | Age: 38
End: 2023-10-11
Payer: COMMERCIAL

## 2023-10-11 NOTE — TELEPHONE ENCOUNTER
Pt called the clinic stating he needs a refill of his lisinopril in about 10 days, but Jazz says he needs an appt to get a refill.     Assisted pt with scheduling VV for a med check. Pt states he has been logging his BPs and that they have been stable.   Oct 13, 2023  1:00 PM  (Arrive by 12:40 PM)  Provider Visit with Trent Chisholm MD  [facility] ([facility] ) [phone]       Routing to PCP to confirm that pt is okay to be seen virtually.   Thank you,  Alie Costello, RN

## 2023-10-13 ENCOUNTER — VIRTUAL VISIT (OUTPATIENT)
Dept: INTERNAL MEDICINE | Facility: CLINIC | Age: 38
End: 2023-10-13
Payer: COMMERCIAL

## 2023-10-13 DIAGNOSIS — I10 ESSENTIAL HYPERTENSION: Primary | ICD-10-CM

## 2023-10-13 PROCEDURE — 99213 OFFICE O/P EST LOW 20 MIN: CPT | Mod: VID | Performed by: INTERNAL MEDICINE

## 2023-10-13 RX ORDER — LISINOPRIL 20 MG/1
20 TABLET ORAL DAILY
Qty: 90 TABLET | Refills: 2 | Status: SHIPPED | OUTPATIENT
Start: 2023-10-13 | End: 2024-06-11

## 2023-10-13 NOTE — PROGRESS NOTES
Ayden is a 38 year old who is being evaluated via a billable video visit.      How would you like to obtain your AVS? MyChart  If the video visit is dropped, the invitation should be resent by: Text to cell phone: [phone]  Will anyone else be joining your video visit? No    Assessment & Plan   Essential hypertension  BP nearly to goal. Discussed a bit more exercise (he admits he has not increased this as he'd like), weight loss (he's down 9 lbs), and continued dietary changes ought to get him there. Continue 20mg daily of lisinopril. Follow up in summer 2024, earlier PRN.  - lisinopril (ZESTRIL) 20 MG tablet; Take 1 tablet (20 mg) by mouth daily    Trent Nelson MD  [facility]   yAden is a 38 year old who presents for a next day virtual video visit with chief concern of:  Hypertension    HPI   Hypertension Follow-up  Do you check your blood pressure regularly outside of the clinic? Yes   Are you following a low salt diet? No  Are your blood pressures ever more than 140 on the top number (systolic) OR more than 90 on the bottom number (diastolic), for example 140/90? Yes    Reports home BP 120s-140s/70s-90s.    Review of Systems   Cardiovascular system negative, except as otherwise noted.      Objective       Vitals: No vitals were obtained today due to virtual visit.    Physical Exam   GENERAL: Healthy, alert and no distress  EYES: Eyes grossly normal to inspection.  No discharge or erythema, or obvious scleral/conjunctival abnormalities.  RESP: No audible wheeze, cough, or visible cyanosis.  No visible retractions or increased work of breathing.    SKIN: Visible skin clear. No significant rash, abnormal pigmentation or lesions.  NEURO: Cranial nerves grossly intact.  Mentation and speech appropriate for age.  PSYCH: Mentation appears normal, affect normal/bright, judgement and insight intact, normal speech and appearance well-groomed.    Video-Visit Details  Type of  service: Video Visit   Originating Location (pt. Location): Home  Distant Location (provider location):  On-site  Platform used for Video Visit: [employer]

## 2024-01-05 ENCOUNTER — TRANSFERRED RECORDS (OUTPATIENT)
Dept: HEALTH INFORMATION MANAGEMENT | Facility: CLINIC | Age: 39
End: 2024-01-05
Payer: COMMERCIAL

## 2024-06-10 SDOH — HEALTH STABILITY: PHYSICAL HEALTH: ON AVERAGE, HOW MANY MINUTES DO YOU ENGAGE IN EXERCISE AT THIS LEVEL?: 40 MIN

## 2024-06-10 SDOH — HEALTH STABILITY: PHYSICAL HEALTH: ON AVERAGE, HOW MANY DAYS PER WEEK DO YOU ENGAGE IN MODERATE TO STRENUOUS EXERCISE (LIKE A BRISK WALK)?: 3 DAYS

## 2024-06-10 ASSESSMENT — SOCIAL DETERMINANTS OF HEALTH (SDOH): HOW OFTEN DO YOU GET TOGETHER WITH FRIENDS OR RELATIVES?: TWICE A WEEK

## 2024-06-11 ENCOUNTER — OFFICE VISIT (OUTPATIENT)
Dept: INTERNAL MEDICINE | Facility: CLINIC | Age: 39
End: 2024-06-11
Attending: INTERNAL MEDICINE
Payer: COMMERCIAL

## 2024-06-11 VITALS
DIASTOLIC BLOOD PRESSURE: 80 MMHG | OXYGEN SATURATION: 98 % | SYSTOLIC BLOOD PRESSURE: 124 MMHG | HEIGHT: 77 IN | WEIGHT: 229.3 LBS | HEART RATE: 75 BPM | BODY MASS INDEX: 27.07 KG/M2 | TEMPERATURE: 98.1 F

## 2024-06-11 DIAGNOSIS — N20.0 NEPHROLITHIASIS: ICD-10-CM

## 2024-06-11 DIAGNOSIS — Z13.220 ENCOUNTER FOR SCREENING FOR LIPID DISORDER: ICD-10-CM

## 2024-06-11 DIAGNOSIS — Z00.00 ROUTINE GENERAL MEDICAL EXAMINATION AT A HEALTH CARE FACILITY: Primary | ICD-10-CM

## 2024-06-11 DIAGNOSIS — I10 ESSENTIAL HYPERTENSION: ICD-10-CM

## 2024-06-11 DIAGNOSIS — Z80.42 FAMILY HISTORY OF PROSTATE CANCER: ICD-10-CM

## 2024-06-11 DIAGNOSIS — L98.9 SKIN LESION: ICD-10-CM

## 2024-06-11 DIAGNOSIS — K60.30 ANAL FISTULA: ICD-10-CM

## 2024-06-11 LAB
ALT SERPL W P-5'-P-CCNC: 37 U/L (ref 0–70)
ANION GAP SERPL CALCULATED.3IONS-SCNC: 10 MMOL/L (ref 7–15)
BUN SERPL-MCNC: 16.2 MG/DL (ref 6–20)
CALCIUM SERPL-MCNC: 9.6 MG/DL (ref 8.6–10)
CHLORIDE SERPL-SCNC: 106 MMOL/L (ref 98–107)
CHOLEST SERPL-MCNC: 202 MG/DL
CREAT SERPL-MCNC: 0.87 MG/DL (ref 0.67–1.17)
DEPRECATED HCO3 PLAS-SCNC: 24 MMOL/L (ref 22–29)
EGFRCR SERPLBLD CKD-EPI 2021: >90 ML/MIN/1.73M2
FASTING STATUS PATIENT QL REPORTED: YES
FASTING STATUS PATIENT QL REPORTED: YES
GLUCOSE SERPL-MCNC: 93 MG/DL (ref 70–99)
HDLC SERPL-MCNC: 52 MG/DL
LDLC SERPL CALC-MCNC: 129 MG/DL
NONHDLC SERPL-MCNC: 150 MG/DL
POTASSIUM SERPL-SCNC: 4.3 MMOL/L (ref 3.4–5.3)
SODIUM SERPL-SCNC: 140 MMOL/L (ref 135–145)
TRIGL SERPL-MCNC: 105 MG/DL

## 2024-06-11 PROCEDURE — 99213 OFFICE O/P EST LOW 20 MIN: CPT | Mod: 25 | Performed by: INTERNAL MEDICINE

## 2024-06-11 PROCEDURE — 84460 ALANINE AMINO (ALT) (SGPT): CPT | Performed by: INTERNAL MEDICINE

## 2024-06-11 PROCEDURE — 99396 PREV VISIT EST AGE 40-64: CPT | Performed by: INTERNAL MEDICINE

## 2024-06-11 PROCEDURE — 36415 COLL VENOUS BLD VENIPUNCTURE: CPT | Performed by: INTERNAL MEDICINE

## 2024-06-11 PROCEDURE — 80048 BASIC METABOLIC PNL TOTAL CA: CPT | Performed by: INTERNAL MEDICINE

## 2024-06-11 PROCEDURE — 80061 LIPID PANEL: CPT | Performed by: INTERNAL MEDICINE

## 2024-06-11 RX ORDER — LISINOPRIL 20 MG/1
20 TABLET ORAL DAILY
Qty: 90 TABLET | Refills: 4 | Status: SHIPPED | OUTPATIENT
Start: 2024-06-11

## 2024-06-11 NOTE — PATIENT INSTRUCTIONS
- I will send you a message on aScentias when I am able to look at the results of your tests from today

## 2024-06-11 NOTE — PROGRESS NOTES
"Preventive Care Visit  [facility]  Trent Nelson MD, Internal Medicine  Jun 11, 2024    Assessment & Plan   Routine general medical examination at a health care facility  Reviewed PMH. Discussed healthcare maintenance issues, including cancer screenings, relevant immunizations, and cardiac risk factor screenings such as for cholesterol, HTN, and DM.  - PRIMARY CARE FOLLOW-UP SCHEDULING; Future    Essential hypertension  BP at goal on current regimen. Refilled.  - Basic metabolic panel; Future  - ALT; Future  - lisinopril (ZESTRIL) 20 MG tablet; Take 1 tablet (20 mg) by mouth daily    Skin lesion  Discussed removal in office today but he preferred derm eval for other derm questions too. Referral placed.  - Adult Dermatology  Referral; Future    Nephrolithiasis  Saw urology last year who recommended repeat CT a/p in May 2024 and follow-up visit. Ayden has not had those scheduled yet. I encouraged him to do so.    Family history of prostate cancer  PSA WNL 2 years ago. Discussed screening recommended to start at age 50-55. Encouraged him to talk to urology about this as well.    Encounter for screening for lipid disorder  Fasting lab check.  - Lipid panel reflex to direct LDL Fasting; Future    Anal fistula  He saw colorectal surgeons in Jan 2024. They recommended surgery but he did not find the timing (10 weeks out from that appt date) to be acceptable so declined. He tells me his plan is to 'wait for it to get bad again and see what happens'.    BMI  Estimated body mass index is 27.19 kg/m  as calculated from the following:    Height as of this encounter: 1.956 m (6' 5\").    Weight as of this encounter: 104 kg (229 lb 4.8 oz).     Counseling  Appropriate preventive services were discussed with this patient, including applicable screening as appropriate for fall prevention, nutrition, physical activity, Tobacco-use cessation, weight loss and cognition.  Checklist reviewing preventive " services available has been given to the patient. Reviewed patient's diet, addressing concerns and/or questions.  He is at risk for lack of exercise and has been provided with information to increase physical activity for the benefit of his well-being. He is at risk for psychosocial distress and has been provided with information to reduce risk.     Signed Electronically by:  Trent Nelson MD, MPH  [facility]  Internal Medicine    Subjective   Ayden is a 39 year old presenting for the following: Physical and Skin Tags (1 on Low back)    Ayden presents today for a physical exam. We also discussed his blood pressure and a skin tag he has on his lower back.        6/10/2024   General Health   How would you rate your overall physical health? Good   Feel stress (tense, anxious, or unable to sleep) Only a little   (!) STRESS CONCERN      6/10/2024   Nutrition   Three or more servings of calcium each day? (!) NO   Diet: Regular (no restrictions)   How many servings of fruit and vegetables per day? (!) 0-1   How many sweetened beverages each day? (!) 2         6/10/2024   Exercise   Days per week of moderate/strenous exercise 3 days   Average minutes spent exercising at this level 40 min         6/10/2024   Social Factors   Frequency of gathering with friends or relatives Twice a week   Worry food won't last until get money to buy more No   Food not last or not have enough money for food? No   Do you have housing?  Yes   Are you worried about losing your housing? No   Lack of transportation? No   Unable to get utilities (heat,electricity)? No         6/10/2024   Dental   Dentist two times every year? Yes         6/10/2024   TB Screening   Were you born outside of the [place]? No     Today's PHQ-2 Score:       6/10/2024     8:48 PM   PHQ-2 ( 1999 [employer])   Q1: Little interest or pleasure in doing things 0   Q2: Feeling down, depressed or hopeless 0   PHQ-2 Score 0   Q1: Little interest or pleasure  "in doing things Not at all   Q2: Feeling down, depressed or hopeless Not at all   PHQ-2 Score 0         6/10/2024   Substance Use   Alcohol more than 3/day or more than 7/wk No   Do you use any other substances recreationally? No     Social History     Tobacco Use    Smoking status: Never    Smokeless tobacco: Never   Vaping Use    Vaping status: Never Used   Substance Use Topics    Alcohol use: Yes     Alcohol/week: 2.0 standard drinks of alcohol     Types: 2 Standard drinks or equivalent per week    Drug use: Not Currently         6/10/2024   One time HIV Screening   Previous HIV test? No         6/10/2024   STI Screening   New sexual partner(s) since last STI/HIV test? No   ASCVD Risk   The 10-year ASCVD risk score (Ursula GOLDBERG, et al., 2019) is: 1.7%    Values used to calculate the score:      Age: 39 years      Sex: Male      Is Non- : No      Diabetic: No      Tobacco smoker: No      Systolic Blood Pressure: 124 mmHg      Is BP treated: Yes      HDL Cholesterol: 50 mg/dL      Total Cholesterol: 224 mg/dL        6/10/2024   Contraception/Family Planning   Questions about contraception or family planning No     Reviewed and updated as needed this visit by Provider   Tobacco  Allergies  Meds  Problems  Med Hx  Surg Hx  Fam Hx             Objective    Exam  /80   Pulse 75   Temp 98.1  F (36.7  C) (Temporal)   Ht 1.956 m (6' 5\")   Wt 104 kg (229 lb 4.8 oz)   SpO2 98%   BMI 27.19 kg/m     Estimated body mass index is 27.19 kg/m  as calculated from the following:    Height as of this encounter: 1.956 m (6' 5\").    Weight as of this encounter: 104 kg (229 lb 4.8 oz).    Physical Exam  GENERAL: In no distress.  EYES: Conjunctivae/corneas clear. EOMs grossly intact.  HENT: NC/AT, facies symmetric. Neck supple. No LAD or thyromegaly noted.  RESP: CTAB. No w/r/r.  CV: RRR, no m/r/g.  GI: NT, ND, without rebound or guarding, no CVA tenderness, no hepatomegaly " appreciated.  MSK: Moves all four extremities freely.  SKIN: ~1cm skin tag on lower back. Skin mole on R forearm. No other significant ulcers, lesions or rashes on the visualized portions of the skin  NEURO: Alert. Oriented.  PSYCH: Linear thought process. Speech normal rate and volume. No tangential thoughts, hallucinations, or delusions.

## 2024-09-30 ENCOUNTER — ANCILLARY PROCEDURE (OUTPATIENT)
Dept: GENERAL RADIOLOGY | Facility: CLINIC | Age: 39
End: 2024-09-30
Attending: PHYSICIAN ASSISTANT
Payer: COMMERCIAL

## 2024-09-30 ENCOUNTER — OFFICE VISIT (OUTPATIENT)
Dept: INTERNAL MEDICINE | Facility: CLINIC | Age: 39
End: 2024-09-30
Payer: COMMERCIAL

## 2024-09-30 VITALS
RESPIRATION RATE: 16 BRPM | WEIGHT: 226.8 LBS | SYSTOLIC BLOOD PRESSURE: 114 MMHG | DIASTOLIC BLOOD PRESSURE: 80 MMHG | HEIGHT: 77 IN | BODY MASS INDEX: 26.78 KG/M2 | OXYGEN SATURATION: 96 % | HEART RATE: 92 BPM

## 2024-09-30 DIAGNOSIS — R06.2 WHEEZING: ICD-10-CM

## 2024-09-30 DIAGNOSIS — J30.2 SEASONAL ALLERGIC RHINITIS, UNSPECIFIED TRIGGER: ICD-10-CM

## 2024-09-30 DIAGNOSIS — R05.1 ACUTE COUGH: ICD-10-CM

## 2024-09-30 DIAGNOSIS — R05.1 ACUTE COUGH: Primary | ICD-10-CM

## 2024-09-30 PROCEDURE — 99213 OFFICE O/P EST LOW 20 MIN: CPT | Performed by: PHYSICIAN ASSISTANT

## 2024-09-30 PROCEDURE — 71046 X-RAY EXAM CHEST 2 VIEWS: CPT | Mod: TC | Performed by: RADIOLOGY

## 2024-09-30 RX ORDER — PREDNISONE 20 MG/1
20 TABLET ORAL DAILY
Qty: 7 TABLET | Refills: 0 | Status: SHIPPED | OUTPATIENT
Start: 2024-09-30 | End: 2024-10-07

## 2024-09-30 RX ORDER — ALBUTEROL SULFATE 90 UG/1
2 AEROSOL, METERED RESPIRATORY (INHALATION) EVERY 4 HOURS PRN
Qty: 18 G | Refills: 0 | Status: SHIPPED | OUTPATIENT
Start: 2024-09-30

## 2024-09-30 RX ORDER — CODEINE PHOSPHATE AND GUAIFENESIN 10; 100 MG/5ML; MG/5ML
1-2 SOLUTION ORAL
Qty: 120 ML | Refills: 0 | Status: SHIPPED | OUTPATIENT
Start: 2024-09-30

## 2024-09-30 NOTE — PROGRESS NOTES
"  Assessment & Plan     Acute cough    - XR Chest 2 Views; Future  - guaiFENesin-codeine (ROBITUSSIN AC) 100-10 MG/5ML solution; Take 5-10 mLs by mouth nightly as needed for cough.    Wheezing    - XR Chest 2 Views; Future  - albuterol (PROAIR HFA/PROVENTIL HFA/VENTOLIN HFA) 108 (90 Base) MCG/ACT inhaler; Inhale 2 puffs into the lungs every 4 hours as needed for shortness of breath, wheezing or cough.  - predniSONE (DELTASONE) 20 MG tablet; Take 1 tablet (20 mg) by mouth daily for 7 days.    Seasonal allergic rhinitis, unspecified trigger  Continue with OTC allergy medication    Will notify of results on xray on my chart  Meds as above  Consider abx if not improving ( change in phlegm, sinus pain or pressure)       BMI  Estimated body mass index is 26.89 kg/m  as calculated from the following:    Height as of this encounter: 1.956 m (6' 5\").    Weight as of this encounter: 102.9 kg (226 lb 12.8 oz).             Subjective   Ayden is a 39 year old, presenting for the following health issues:  URI    History of Present Illness       Reason for visit:  Cough weeze  Symptom onset:  More than a month  Symptom intensity:  Mild  Symptom progression:  Worsening  Had these symptoms before:  No  What makes it worse:  No  What makes it better:  Allergy pill   He is taking medications regularly.     RESPIRATORY SYMPTOMS    Duration: 6 weeks  Worse in the past few days to a week  Cough worse at night     Description  nasal congestion, rhinorrhea, and cough  103- 2 weeks ago- congestion and coughing. X 1 day   Since with fatigue and coughing chest congestion  On allergy meds for seasonal allergies.   Severity: moderate  Accompanying signs and symptoms: None  History (predisposing factors):  asthma as a child  Precipitating or alleviating factors: None  Therapies tried and outcome:  rest and fluids antihistamine                 Review of Systems  Constitutional, HEENT, cardiovascular, pulmonary, gi and gu systems are negative, " "except as otherwise noted.      Objective    /80   Pulse 92   Resp 16   Ht 1.956 m (6' 5\")   Wt 102.9 kg (226 lb 12.8 oz)   SpO2 96%   BMI 26.89 kg/m    Body mass index is 26.89 kg/m .  Physical Exam   GENERAL: alert and no distress  HENT: normal cephalic/atraumatic, ear canals and TM's normal, nose and mouth without ulcers or lesions, oropharynx clear, and oral mucous membranes moist  RESP: no rales , no rhonchi, and expiratory wheezes bilateral and throughout  CV: regular rates and rhythm and normal S1 S2, no S3 or S4  MS: no gross musculoskeletal defects noted, no edema            Signed Electronically by: Cristin Torres PA-C    "